# Patient Record
Sex: MALE | Race: WHITE | Employment: FULL TIME | URBAN - METROPOLITAN AREA
[De-identification: names, ages, dates, MRNs, and addresses within clinical notes are randomized per-mention and may not be internally consistent; named-entity substitution may affect disease eponyms.]

---

## 2017-02-22 ENCOUNTER — DOCUMENTATION ONLY (OUTPATIENT)
Dept: SLEEP MEDICINE | Facility: CLINIC | Age: 37
End: 2017-02-22

## 2017-02-22 DIAGNOSIS — G47.33 OSA (OBSTRUCTIVE SLEEP APNEA): ICD-10-CM

## 2017-02-22 NOTE — PROGRESS NOTES
6 Month [facility] visit    Subjective measures:  Score 0    Objective measures: 14 day rolling measures     Device settings:    EPAP Min Auto CPAP: 13 (CPAP Min Auto CPAP/ASV)    EPAP Max Auto CPAP: 18 (CPAP Max Auto CPAP/ASV)    Avg EPAP pressure (90th %ile) 14 day average (Brisa): 17.1cm H20    Objective measures: 14 day rolling measures      Compliance   (Goal >70%)  --% compliance greater than four hours rolling average 14 days: [id number] %      Leak   (Goal < 10%)  --Average % of night in large leak Rolling Average 14 days (BRISA): 0.03/100      AHI  (Goal < 5)  --AHI Rolling Average 14 Day: 0.94      Usage  (Goal >240)  --Time mask on face 14 day average: 457 min    Assessment:Pt meeting objective benchmarks. Patient meeting subjective benchmarks.  Action plan: pt to follow up per provider request (1-2 yrs)

## 2017-08-18 ENCOUNTER — OFFICE VISIT (OUTPATIENT)
Dept: FAMILY MEDICINE | Facility: CLINIC | Age: 37
End: 2017-08-18
Payer: COMMERCIAL

## 2017-08-18 VITALS
BODY MASS INDEX: 40.73 KG/M2 | HEART RATE: 82 BPM | TEMPERATURE: 98.6 F | WEIGHT: 290.9 LBS | RESPIRATION RATE: 16 BRPM | SYSTOLIC BLOOD PRESSURE: 130 MMHG | DIASTOLIC BLOOD PRESSURE: 82 MMHG | OXYGEN SATURATION: 98 % | HEIGHT: 71 IN

## 2017-08-18 DIAGNOSIS — E66.01 MORBID OBESITY DUE TO EXCESS CALORIES (H): Chronic | ICD-10-CM

## 2017-08-18 DIAGNOSIS — R07.0 THROAT PAIN: ICD-10-CM

## 2017-08-18 DIAGNOSIS — J02.9 VIRAL PHARYNGITIS: Primary | ICD-10-CM

## 2017-08-18 LAB
DEPRECATED S PYO AG THROAT QL EIA: NORMAL
SPECIMEN SOURCE: NORMAL

## 2017-08-18 PROCEDURE — 99213 OFFICE O/P EST LOW 20 MIN: CPT | Performed by: PHYSICIAN ASSISTANT

## 2017-08-18 PROCEDURE — 87880 STREP A ASSAY W/OPTIC: CPT | Performed by: PHYSICIAN ASSISTANT

## 2017-08-18 PROCEDURE — 87081 CULTURE SCREEN ONLY: CPT | Performed by: PHYSICIAN ASSISTANT

## 2017-08-18 ASSESSMENT — PAIN SCALES - GENERAL: PAINLEVEL: SEVERE PAIN (7)

## 2017-08-18 NOTE — NURSING NOTE
"Chief Complaint   Patient presents with     Pharyngitis       Initial /82 (BP Location: Right arm, Patient Position: Right side, Cuff Size: Adult Regular)  Pulse 82  Temp 98.6  F (37  C) (Oral)  Resp 16  Ht 1.803 m (5' 11\")  Wt 132 kg (290 lb 14.4 oz)  SpO2 98%  BMI 40.57 kg/m2 Estimated body mass index is 40.57 kg/(m^2) as calculated from the following:    Height as of this encounter: 1.803 m (5' 11\").    Weight as of this encounter: 132 kg (290 lb 14.4 oz).  Medication Reconciliation: complete     Will Noah LAMA      "

## 2017-08-18 NOTE — MR AVS SNAPSHOT
After Visit Summary   8/18/2017    Nicola Uriarte    MRN: [id number]           Patient Information     Date Of Birth          1980        Visit Information        Provider Department      8/18/2017 8:00 AM Aline Rowe PA-C [facility]        Today's Diagnoses     Throat pain    -  1      Care Instructions    Take ibuprofen up to 3 over the counter tablets four times a day with food   Follow up with us next week if symptoms not improving.   Return urgently if any change in symptoms like fever, increasing pain, difficulty swallowing liquids or other change in symptoms.           Follow-ups after your visit        Who to contact     If you have questions or need follow up information about today's clinic visit or your schedule please contact [facility] directly at [phone].  Normal or non-critical lab and imaging results will be communicated to you by i3 membranehart, letter or phone within 4 business days after the clinic has received the results. If you do not hear from us within 7 days, please contact the clinic through i3 membranehart or phone. If you have a critical or abnormal lab result, we will notify you by phone as soon as possible.  Submit refill requests through [facility] or call your pharmacy and they will forward the refill request to us. Please allow 3 business days for your refill to be completed.          Additional Information About Your Visit        MyChart Information     [facility] gives you secure access to your electronic health record. If you see a primary care provider, you can also send messages to your care team and make appointments. If you have questions, please call your primary care clinic.  If you do not have a primary care provider, please call [phone] and they will assist you.        Care EveryWhere ID     This is your Care EveryWhere ID. This could be used by other organizations to access your [facility] medical records  [id number]       "  Your Vitals Were     Pulse Temperature Respirations Height Pulse Oximetry BMI (Body Mass Index)    82 98.6  F (37  C) (Oral) 16 1.803 m (5' 11\") 98% 40.57 kg/m2       Blood Pressure from Last 3 Encounters:   08/18/17 130/82   09/29/16 131/85   07/27/16 (!) 155/101    Weight from Last 3 Encounters:   08/18/17 132 kg (290 lb 14.4 oz)   09/29/16 131.1 kg (289 lb)   07/27/16 127 kg (280 lb)              We Performed the Following     Beta strep group A culture     Strep, Rapid Screen        Primary Care Provider Office Phone #    [facility] [phone]       No address on file        Equal Access to Services     KRISH PEPE : Rica Aguila, waaxda luqadaha, qaybta kaalmada adeegyada, lizbeth davis . So [facility] [phone].    ATENCIÓN: Si habla español, tiene a dunaway disposición servicios gratuitos de asistencia lingüística. Llame al [phone].    We comply with applicable federal civil rights laws and [place] laws. We do not discriminate on the basis of race, color, national origin, age, disability sex, sexual orientation or gender identity.            Thank you!     Thank you for choosing [facility]  for your care. Our goal is always to provide you with excellent care. Hearing back from our patients is one way we can continue to improve our services. Please take a few minutes to complete the written survey that you may receive in the mail after your visit with us. Thank you!             Your Updated Medication List - Protect others around you: Learn how to safely use, store and throw away your medicines at www.disposemymeds.org.          This list is accurate as of: 8/18/17  8:23 AM.  Always use your most recent med list.                   Brand Name Dispense Instructions for use Diagnosis    order for DME     1 Units    Respironics Dream Station Auto CPAP 13-18 cm, Simplus med FFM.    RAFAELA (obstructive sleep apnea)         "

## 2017-08-18 NOTE — PATIENT INSTRUCTIONS
Take ibuprofen up to 3 over the counter tablets four times a day with food   Follow up with us next week if symptoms not improving.   Return urgently if any change in symptoms like fever, increasing pain, difficulty swallowing liquids or other change in symptoms.

## 2017-08-18 NOTE — PROGRESS NOTES
SUBJECTIVE:   Nicola Uriarte is a 37 year old male who presents to clinic today for the following health issues:      Acute Illness   Acute illness concerns: sore throat, body aches  Onset: 8/14/17    Fever: no    Chills/Sweats: no    Headache (location?): YES    Sinus Pressure:YES    Conjunctivitis:  no    Ear Pain: YES- bilateral    Rhinorrhea: no    Congestion: YES- slight    Sore Throat: YES     Cough: YES - minor    Wheeze: no    Decreased Appetite: no    Nausea:  YES - this AM    Vomiting: no    Diarrhea:  no    Dysuria/Freq.: no    Fatigue/Achiness: YES    Sick/Strep Exposure: no     Therapies Tried and outcome: Cold and Sinus - last night, helped with sleep        Symptoms for 4 days. Last night first time took something - difficulty sleeping due to sore throat.  Hurts to swallow.  No loss of appetite.  Able to drink and eat.  Some nausea this am.  No abdominal pain.  No fever.     Aware needs to lose weight  Reports tries to improve diet and exercise.     Problem list and histories reviewed & adjusted, as indicated.  Additional history: as documented    Patient Active Problem List   Diagnosis     Morbid obesity due to excess calories (H)     History of colonic polyps     CARDIOVASCULAR SCREENING; LDL GOAL LESS THAN 160     RAFAELA (obstructive sleep apnea)- severe (106)     Past Surgical History:   Procedure Laterality Date     COLONOSCOPY  2007    polyp       Social History   Substance Use Topics     Smoking status: Never Smoker     Smokeless tobacco: Never Used     Alcohol use 5.0 oz/week     10 drink(s) per week      Comment: mostly wine and beer     Family History   Problem Relation Age of Onset     Hypertension Mother      Unknown/Adopted Father      C.A.D. Maternal Grandfather      CABG     Genitourinary Problems Mother      polycystic kidney disease-recessive         Current Outpatient Prescriptions   Medication Sig Dispense Refill     order for [facility] Respironics Dream Station Auto CPAP 13-18 cm,  "Simplus med FFM. 1 Units 0         Reviewed and updated as needed this visit by clinical staffTobacco  Allergies  Meds  Med Hx  Surg Hx  Fam Hx  Soc Hx      Reviewed and updated as needed this visit by Provider         ROS:  Constitutional, HEENT, cardiovascular, pulmonary, gi and gu systems are negative, except as otherwise noted.      OBJECTIVE:   /82 (BP Location: Right arm, Patient Position: Right side, Cuff Size: Adult Regular)  Pulse 82  Temp 98.6  F (37  C) (Oral)  Resp 16  Ht 1.803 m (5' 11\")  Wt 132 kg (290 lb 14.4 oz)  SpO2 98%  BMI 40.57 kg/m2  Body mass index is 40.57 kg/(m^2).  GENERAL: healthy, alert and no distress  EYES: Eyes grossly normal to inspection, PERRL and conjunctivae and sclerae normal  HENT: ear canals and TM's normal, nose and mouth without ulcers or lesions  NECK: no adenopathy, no asymmetry, masses, or scars and thyroid normal to palpation  RESP: lungs clear to auscultation - no rales, rhonchi or wheezes  CV: regular rate and rhythm, normal S1 S2, no S3 or S4, no murmur, click or rub, no peripheral edema and peripheral pulses strong  ABDOMEN: soft, nontender, no hepatosplenomegaly, no masses and bowel sounds normal  MS: no gross musculoskeletal defects noted, no edema  SKIN: no suspicious lesions or rashes    Diagnostic Test Results:  Results for orders placed or performed in visit on 08/18/17 (from the past 24 hour(s))   Strep, Rapid Screen   Result Value Ref Range    Specimen Description Throat     Rapid Strep A Screen       NEGATIVE: No Group A streptococcal antigen detected by immunoassay, await culture report.       ASSESSMENT/PLAN:         BMI:   Estimated body mass index is 40.57 kg/(m^2) as calculated from the following:    Height as of this encounter: 1.803 m (5' 11\").    Weight as of this encounter: 132 kg (290 lb 14.4 oz).   Weight management plan: Discussed healthy diet and exercise guidelines and patient will follow up in 6 months in clinic to " re-evaluate.        1. Viral pharyngitis  Encouraged symptomatic cares.  Follow up with us next week if no improvement in symptoms.     2. Morbid obesity due to excess calories (H)  Encouraged diet and exercise    3. Throat pain    - Strep, Rapid Screen  - Beta strep group A culture    Patient Instructions   Take ibuprofen up to 3 over the counter tablets four times a day with food   Follow up with us next week if symptoms not improving.   Return urgently if any change in symptoms like fever, increasing pain, difficulty swallowing liquids or other change in symptoms.        Aline Rowe PA-C  [facility]

## 2017-08-19 LAB
BACTERIA SPEC CULT: NORMAL
SPECIMEN SOURCE: NORMAL

## 2017-08-20 NOTE — PROGRESS NOTES
Momo Petersen  Your strep culture was negative.  Please call or MyChart my office with any questions or concerns.    Aline Rowe, PAC

## 2018-02-25 ENCOUNTER — HEALTH MAINTENANCE LETTER (OUTPATIENT)
Age: 38
End: 2018-02-25

## 2019-06-10 ENCOUNTER — OFFICE VISIT (OUTPATIENT)
Dept: FAMILY MEDICINE | Facility: CLINIC | Age: 39
End: 2019-06-10
Payer: COMMERCIAL

## 2019-06-10 VITALS
OXYGEN SATURATION: 96 % | HEIGHT: 72 IN | SYSTOLIC BLOOD PRESSURE: 141 MMHG | HEART RATE: 74 BPM | RESPIRATION RATE: 20 BRPM | DIASTOLIC BLOOD PRESSURE: 95 MMHG | TEMPERATURE: 98.6 F | WEIGHT: 303 LBS | BODY MASS INDEX: 41.04 KG/M2

## 2019-06-10 DIAGNOSIS — R03.0 ELEVATED BLOOD PRESSURE READING WITHOUT DIAGNOSIS OF HYPERTENSION: ICD-10-CM

## 2019-06-10 DIAGNOSIS — M10.9 ACUTE GOUT INVOLVING TOE OF LEFT FOOT, UNSPECIFIED CAUSE: Primary | ICD-10-CM

## 2019-06-10 DIAGNOSIS — L91.8 SKIN TAG: ICD-10-CM

## 2019-06-10 PROCEDURE — 99213 OFFICE O/P EST LOW 20 MIN: CPT | Performed by: NURSE PRACTITIONER

## 2019-06-10 RX ORDER — INDOMETHACIN 50 MG/1
50 CAPSULE ORAL 3 TIMES DAILY PRN
Qty: 60 CAPSULE | Refills: 1 | Status: SHIPPED | OUTPATIENT
Start: 2019-06-10

## 2019-06-10 ASSESSMENT — MIFFLIN-ST. JEOR: SCORE: 2314.46

## 2019-06-10 ASSESSMENT — PAIN SCALES - GENERAL: PAINLEVEL: NO PAIN (0)

## 2019-06-10 NOTE — PROGRESS NOTES
Subjective     Nicola Uriarte is a 38 year old male who presents to clinic today for the following health issues:    HPI   Concern - Lt Great toe  Onset: last thursday    Description:   The joint was in a lot of pain, no swelling but the pain has subsided now    Intensity: mild    Progression of Symptoms:  improving    Accompanying Signs & Symptoms:  no    Previous history of similar problem:   Yes a couple months ago but it didn't last longer than 24 hrs-he is unsure if he stubbed his foot then.     Precipitating factors:   Worsened by: unsure    Alleviating factors:  Improved by:     Therapies Tried and outcome: advil, aleve which helped. And tylenol but that did not work    Never had gout. His grandpa had it.     Skin tag behind left ear.     Patient Active Problem List   Diagnosis     Morbid obesity due to excess calories (H)     History of colonic polyps     CARDIOVASCULAR SCREENING; LDL GOAL LESS THAN 160     RAFAELA (obstructive sleep apnea)- severe (106)     Skin tag     Past Surgical History:   Procedure Laterality Date     COLONOSCOPY  2007    polyp       Social History     Tobacco Use     Smoking status: Never Smoker     Smokeless tobacco: Never Used   Substance Use Topics     Alcohol use: Yes     Alcohol/week: 5.0 oz     Types: 10 Standard drinks or equivalent per week     Comment: mostly wine and beer     Family History   Problem Relation Age of Onset     Hypertension Mother      Genitourinary Problems Mother         polycystic kidney disease-recessive     Unknown/Adopted Father      C.A.D. Maternal Grandfather         CABG     Asthma Paternal Grandmother      Gout Paternal Grandmother      Diabetes No family hx of      Thyroid Disease No family hx of              Reviewed and updated as needed this visit by Provider  Tobacco  Allergies  Meds  Problems  Med Hx  Surg Hx  Fam Hx         Review of Systems   ROS COMP: Constitutional, cardiovascular, pulmonary, MS as above, skin as above systems are  "negative, except as otherwise noted.      Objective    BP (!) 141/95 (BP Location: Right arm, Patient Position: Sitting, Cuff Size: Adult Large)   Pulse 74   Temp 98.6  F (37  C) (Oral)   Resp 20   Ht 1.816 m (5' 11.5\")   Wt 137.4 kg (303 lb)   SpO2 96%   BMI 41.67 kg/m    Body mass index is 41.67 kg/m .  Physical Exam   GENERAL: healthy, alert and no distress  RESP: lungs clear to auscultation - no rales, rhonchi or wheezes  CV: regular rate and rhythm, normal S1 S2, no S3 or S4, no murmur, click or rub  MS: no gross musculoskeletal defects noted although unable to full bend left toe up/roly  SKIN: left big toe no erythema. Behind left ear small to medium sized stalk with skin tag    Diagnostic Test Results:  Labs reviewed in Epic        Assessment & Plan     1. Acute gout involving toe of left foot, unspecified cause  Sounds like gout attack. Pain almost gone now. Trial anti-inflammatory when having pain if occurs again. If on-going may need to consider daily medication but not at this point.   - indomethacin (INDOCIN) 50 MG capsule; Take 1 capsule (50 mg) by mouth 3 times daily as needed for moderate pain Take with food  Dispense: 60 capsule; Refill: 1    2. Skin tag  Behind left ear, small to medium sized stalk, advised to have MD remove    3. Elevated blood pressure reading without diagnosis of hypertension  Recheck next visit       BMI:   Estimated body mass index is 41.67 kg/m  as calculated from the following:    Height as of this encounter: 1.816 m (5' 11.5\").    Weight as of this encounter: 137.4 kg (303 lb).           Return in about 1 month (around 7/8/2019), or if symptoms worsen or fail to improve.     He is aware he is due for physical and fasting labs also    MAXIM Hardy, NP-C  [facility]"

## 2019-06-10 NOTE — PATIENT INSTRUCTIONS
Return for physical, fasting lab. Also return for skin tag removal      Patient Education     Gout Diet  Gout is a painful condition caused by an excess of uric acid, a waste product made by the body. Uric acid forms crystals that collect in the joints. The immune response to these crystals brings on symptoms of joint pain and swelling. This is called a gout attack. Often, medications and diet changes are combined to manage gout. Below are some guidelines for changing your diet to help you manage gout and prevent attacks. Your healthcare provider will help you determine the best eating plan for you.  Eating to manage gout  Weight loss for those who are overweight may help reduce gout attacks.  Eat less of these foods  Eating too many foods containing purines may raise the levels of uric acid in your body. This raises your risk for a gout attack. Try to limit these foods and drinks:    Alcohol, such as beer and red wine. You may be told to avoid alcohol completely.    Soft drinks that contain sugar or high fructose corn syrup    Certain fish, including anchovies, sardines, fish eggs, and herring    Shellfish    Certain meats, such as red meat, hot dogs, luncheon meats, and turkey    Organ meats, such as liver, kidneys, and sweetbreads    Legumes, such as dried beans and peas    Other high fat foods such as gravy, whole milk, and high fat cheeses    Vegetables such as asparagus, cauliflower, spinach, and mushrooms used to be thought to contribute to an increased risk for a gout attack, but recent studies show that high purine vegetables don't increase the risk for a gout attack.  Eat more of these foods  Other foods may be helpful for people with gout. Add some of these foods to your diet:    Cherries contain chemicals that may lower uric acid.    Omega fatty acids. These are found in some fatty fish such as salmon, certain oils (flax, olive, or nut), and nuts themselves. Omega fatty acids may help prevent  inflammation due to gout.    Dairy products that are low-fat or fat-free, such as cheese and yogurt    Complex carbohydrate foods, including whole grains, brown rice, oats, and beans    Coffee, in moderation    Water, approximately 64 ounces per day  Follow-up care  Follow up with your healthcare provider as advised.  When to seek medical advice  Call your healthcare provider right away if any of these occur:    Return of gout symptoms, usually at night:    Severe pain, swelling, and heat in a joint, especially the base of the big toe    Affected joint is hard to move    Skin of the affected joint is purple or red    Fever of 100.4 F (38 C) or higher    Pain that doesn't get better even with prescribed medicine   Date Last Reviewed: 6/1/2018 2000-2018 [employer]. [address] [place] [address], [place] [address]. All rights reserved. This information is not intended as a substitute for professional medical care. Always follow your healthcare professional's instructions.

## 2020-03-02 ENCOUNTER — HEALTH MAINTENANCE LETTER (OUTPATIENT)
Age: 40
End: 2020-03-02

## 2020-08-26 ENCOUNTER — OFFICE VISIT (OUTPATIENT)
Dept: FAMILY MEDICINE | Facility: CLINIC | Age: 40
End: 2020-08-26
Payer: COMMERCIAL

## 2020-08-26 ENCOUNTER — NURSE TRIAGE (OUTPATIENT)
Dept: NURSING | Facility: CLINIC | Age: 40
End: 2020-08-26

## 2020-08-26 VITALS
DIASTOLIC BLOOD PRESSURE: 96 MMHG | SYSTOLIC BLOOD PRESSURE: 152 MMHG | HEART RATE: 95 BPM | HEIGHT: 71 IN | WEIGHT: 307.2 LBS | OXYGEN SATURATION: 99 % | BODY MASS INDEX: 43.01 KG/M2 | TEMPERATURE: 98.4 F | RESPIRATION RATE: 20 BRPM

## 2020-08-26 DIAGNOSIS — T78.40XA ALLERGIC REACTION, INITIAL ENCOUNTER: Primary | ICD-10-CM

## 2020-08-26 DIAGNOSIS — R03.0 ELEVATED BP WITHOUT DIAGNOSIS OF HYPERTENSION: ICD-10-CM

## 2020-08-26 DIAGNOSIS — E66.01 MORBID OBESITY WITH BMI OF 40.0-44.9, ADULT (H): ICD-10-CM

## 2020-08-26 PROCEDURE — 99214 OFFICE O/P EST MOD 30 MIN: CPT | Performed by: FAMILY MEDICINE

## 2020-08-26 RX ORDER — PREDNISONE 20 MG/1
60 TABLET ORAL DAILY
Qty: 15 TABLET | Refills: 0 | Status: SHIPPED | OUTPATIENT
Start: 2020-08-26

## 2020-08-26 ASSESSMENT — MIFFLIN-ST. JEOR: SCORE: 2319.54

## 2020-08-26 ASSESSMENT — PAIN SCALES - GENERAL: PAINLEVEL: NO PAIN (0)

## 2020-08-26 NOTE — PROGRESS NOTES
"Subjective     Nicola Uriarte is a 40 year old male who presents to clinic today for the following health issues:    HPI       Rash  Onset/Duration: 2 days ago  Description  Location: eye lids, nose and cheek  Character: raised, red, warm to the touch  Itching: no  Intensity:  moderate  Progression of Symptoms:  worsening  Accompanying signs and symptoms:   Fever: no  Body aches or joint pain: no  Sore throat symptoms: no  Recent cold symptoms: no  History:           Previous episodes of similar rash: None  New exposures:  None  Recent travel: no  Exposure to similar rash: no  Precipitating or alleviating factors: unsure did mow the lawn that day   Therapies tried and outcome: benadryl but that did not seem to help    SUBJECTIVE:  Here today for the above issue that started couple of days ago.  No specific contact that he can think of.  He does have a history of seasonal allergies and takes antihistamines and Flonase for this.  That typically manifests as sneezing and itchy eyes.  Cutting the grass does tend to flare that up and he did mow the lawn that day.  But he is never had a reaction such as this.  Denies any throat tightening or wheezing or shortness of breath.  Eyes and cheeks are itchy and swollen.  Antihistamines do not seem to be helping.  Nothing else associated with this however.    Review of systems otherwise negative.  Past medical, family, and social history reviewed and updated in chart.    OBJECTIVE:  BP (!) 152/96   Pulse 95   Temp 98.4  F (36.9  C) (Oral)   Resp 20   Ht 1.81 m (5' 11.25\")   Wt 139.3 kg (307 lb 3.2 oz)   SpO2 99%   BMI 42.55 kg/m    Alert, pleasant, upbeat, and in no apparent discomfort.  Obese  Eyes are a bit puffy in the area around it and cheeks are hyperemic.  I do not appreciate specific hives on the face or the trunk  S1 and S2 normal, no murmurs, clicks, gallops or rubs. Regular rate and rhythm. Chest is clear; no wheezes or rales. No edema or JVD.   Past labs " reviewed with the patient.     ASSESSMENT / PLAN:  (T78.40XA) Allergic reaction, initial encounter  (primary encounter diagnosis)  Comment: Reaction to an unknown etiology.  I discussed with patient that sometimes even seasonal allergies can manifest as hyper reactive skin.  In any case we should be able to settle down with a short course of prednisone and file this away for future such occurrences  Plan: predniSONE (DELTASONE) 20 MG tablet        Discussed mechanism of action of the proposed medication, as well as potential effects, both good and bad.  Patient expressed understanding and agreed with treatment.     (E66.01,  Z68.41) Morbid obesity with BMI of 40.0-44.9, adult (H)  Comment: Discussed his blood pressure issue and his weight.  Start with some lifestyle modifications.  He has a physical set up with me in a month  Plan:     (R03.0) Elevated BP without diagnosis of hypertension  Comment: As above  Plan:     Follow up 2 to 3 days if not improving  SJasiel Naik MD    (Chart documentation completed in part with Dragon voice-recognition software.  Even though reviewed some grammatical, spelling, and word errors may remain.)

## 2020-08-26 NOTE — TELEPHONE ENCOUNTER
Additional Information    Negative: Chemical gets into the eye from fingers, contaminated object, spray, or splash    Negative: Eye pain    Negative: Runny nose and sneezing also present    Negative: Reaction to antibiotic eye drops    Negative: Doesn't match SYMPTOMS of eye allergy    Negative: [1] Sacs of clear  fluid (blisters) on whites of eyes AND [2] more than mild AND [3] not improved 2 hours after allergy treatment per protocol    Negative: [1] Blurred vision AND [2] new or worsening    Eyelids are very swollen (shut or almost)    Negative: Sacs of clear fluid (blisters) on whites of eyes or inner lids    Protocols used: EYE - ALLERGY-A-

## 2020-08-26 NOTE — TELEPHONE ENCOUNTER
Having allergic reaction.  Had bad seasonal allergies.  2 days ago blotchy skin on eye lids, puffy, dry, flaky.  Mowed 2 days ago.  Mowing gets allergies going.    Takes generic clairitin D 3 days in a row but not helping with itching or eye swelling.  Took 2 benadryl 2 hours ago.    Skin on face feels tingly and sore.    He would like to be seen in clinic.  He has not been seen in over a year.    Transferred to [facility]    Izabella CONTI [facility] Nurse Advisor

## 2020-12-14 ENCOUNTER — HEALTH MAINTENANCE LETTER (OUTPATIENT)
Age: 40
End: 2020-12-14

## 2021-04-18 ENCOUNTER — HEALTH MAINTENANCE LETTER (OUTPATIENT)
Age: 41
End: 2021-04-18

## 2021-10-02 ENCOUNTER — HEALTH MAINTENANCE LETTER (OUTPATIENT)
Age: 41
End: 2021-10-02

## 2022-05-14 ENCOUNTER — HEALTH MAINTENANCE LETTER (OUTPATIENT)
Age: 42
End: 2022-05-14

## 2022-09-03 ENCOUNTER — HEALTH MAINTENANCE LETTER (OUTPATIENT)
Age: 42
End: 2022-09-03

## 2023-06-03 ENCOUNTER — HEALTH MAINTENANCE LETTER (OUTPATIENT)
Age: 43
End: 2023-06-03